# Patient Record
Sex: FEMALE | Race: BLACK OR AFRICAN AMERICAN
[De-identification: names, ages, dates, MRNs, and addresses within clinical notes are randomized per-mention and may not be internally consistent; named-entity substitution may affect disease eponyms.]

---

## 2021-05-26 ENCOUNTER — HOSPITAL ENCOUNTER (OUTPATIENT)
Dept: HOSPITAL 35 - LAB | Age: 71
End: 2021-05-26
Payer: COMMERCIAL

## 2021-05-26 DIAGNOSIS — Z01.812: Primary | ICD-10-CM

## 2021-05-26 DIAGNOSIS — Z20.822: ICD-10-CM

## 2021-05-27 ENCOUNTER — HOSPITAL ENCOUNTER (OUTPATIENT)
Dept: HOSPITAL 35 - OR | Age: 71
Discharge: HOME | End: 2021-05-27
Attending: OPHTHALMOLOGY
Payer: COMMERCIAL

## 2021-05-27 VITALS — HEIGHT: 65 IN | WEIGHT: 164 LBS | BODY MASS INDEX: 27.32 KG/M2

## 2021-05-27 VITALS — DIASTOLIC BLOOD PRESSURE: 61 MMHG | SYSTOLIC BLOOD PRESSURE: 143 MMHG

## 2021-05-27 DIAGNOSIS — Z91.041: ICD-10-CM

## 2021-05-27 DIAGNOSIS — E11.9: ICD-10-CM

## 2021-05-27 DIAGNOSIS — Z88.8: ICD-10-CM

## 2021-05-27 DIAGNOSIS — Z90.710: ICD-10-CM

## 2021-05-27 DIAGNOSIS — Z98.51: ICD-10-CM

## 2021-05-27 DIAGNOSIS — Z79.899: ICD-10-CM

## 2021-05-27 DIAGNOSIS — H04.551: ICD-10-CM

## 2021-05-27 DIAGNOSIS — Z98.890: ICD-10-CM

## 2021-05-27 DIAGNOSIS — L92.8: Primary | ICD-10-CM

## 2021-05-27 PROCEDURE — 50398: CPT

## 2021-05-27 PROCEDURE — 62900: CPT

## 2021-05-27 PROCEDURE — 56528: CPT

## 2021-05-27 PROCEDURE — 56531: CPT

## 2021-05-27 PROCEDURE — 51636: CPT

## 2021-05-27 PROCEDURE — 50386 REMOVE STENT VIA TRANSURETH: CPT

## 2021-05-27 PROCEDURE — 51777: CPT

## 2021-05-27 PROCEDURE — 62110: CPT

## 2021-05-27 PROCEDURE — 50010 RENAL EXPLORATION: CPT

## 2021-05-27 PROCEDURE — 70005: CPT

## 2021-05-27 PROCEDURE — 56527: CPT

## 2021-05-27 PROCEDURE — 50101: CPT

## 2021-06-14 NOTE — O
[facility] [place]   [address]                     OPERATIVE REPORT              
_______________________________________________________________________________
 
Name:       EDWARD FREY             Room #:                     [facility] [place] 
M.R.#:      [id number]                       Account #:      [id number]  
Admission:  05/27/21    Attend Phys:    Zacarias Rosenthal MD  
Discharge:  05/27/21    Date of Birth:  [birth year]  
                                                          Report #: [id number]
                                                                    [id number] 
_______________________________________________________________________________
THIS REPORT FOR:  
 
cc:  Ciara Perez MD,Ciara Rosenthal,Zacarias AL MD                                          ~
 
DOC #: [id number]
 
cc:     Gregg Cassidy, Ileana Rosenthal MD
DATE OF SERVICE: 05/27/2021
 
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 
obstruction.
 
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 
obstruction.
 
PROCEDURE:  Excision of tumor of right lower lid with frozen section control of 
margins, myocutaneous flap repair of defect with silicone lacrimal intubation 
and nasal surgical video endoscopy.
 
SURGEON:  Zacarias Rosenthal MD
 
ASSISTANT:  None.
 
ANESTHESIA:  General.
 
COMPLICATIONS:  None.
 
INDICATIONS:  This pleasant 71-year-old woman, has a frondular nodular mass 
destroying the medial right lower lid margin next to her inferior punctum.  The 
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  She presents today for excision
of this lesion with frozen sections along with recanalization of her lacrimal 
outflow tract with a plastic repair of the defect.  Informed consent was 
obtained to include but not limited to the potential risk for loss of vision, 
bleeding, infection, failure to improve the problem, and the potential need for 
further surgery or treatment based upon the pathologic nature of the lesion.
 
DESCRIPTION OF PROCEDURE:  The patient was taken to the operating room where 
general anesthesia was administered.  The right lower lid, the right medial 
canthal area, the right lateral wall of the nose were all generously infiltrated
with Xylocaine with epinephrine mixed with Marcaine and Wydase.  The right side 
of the nose was then packed with Afrin-soaked cottonoids.  The patient was 
subsequently prepped and draped in the usual sterile fashion.  A fine tip skin 
marking pen was utilized to outline the lesion with approximately 1 mm of 
 
 
 
[address]   [address]                     OPERATIVE REPORT              
_______________________________________________________________________________
 
Name:       EDWARD FREY [facility]             Room #:                     [facility] [place] 
M.R.#:      [id number]                       Account #:      [id number]  
Admission:  05/27/21    Attend Phys:    Zacarias Rosenthal MD  
Discharge:  05/27/21    Date of Birth:  [birth year]  
                                                          Report #: [id number]
                                                                    [id number] 
_______________________________________________________________________________
 
normal-appearing tissue around its margin.  The incisions were then made 
perpendicularly across the eyelid margin and drawn down to a point in the 
premalar space.  The specimen was then oriented on a drawing for the waiting 
pathologist.  In the base of the excision; however, there was an additional 
nodule that extended back into the orbit.  This tissue was additionally excised,
but was not a margin because it was in fat.  Hemostasis was re-achieved.  The 
pathologist snap froze the specimen and then it found to be an inflammatory mass
and not frankly neoplastic.  The additional tissue that was in the inferior 
orbit she thought was also inflammatory.  With that information at hand, 
attention was turned to reconstruction of the defect.  The superior and inferior
puncta were dilated.  Bean tubes were then passed through the superior and 
inferior puncta and down into the inferior meatus.  The cottonoids were removed 
from the nose and the nasal vault inspected.  Utilizing the video endoscope and 
the Bean hook, the Bean tubes were secured under the inferior turbinate 
atraumatically.  They were then left to angle out the nose.
 
A myocutaneous flap was then developed laterally to correct that defect.  
Hemostasis was then re-achieved.  The flap was then rotated and secured with 
multiple interrupted 5-0 Vicryl sutures deep.  5-0 Vicryl sutures were also used
to reapproximate the tarsal plate.  Care was taken to ensure that they did not 
involve the canaliculus.  The eyelid margin was then reapproximated with 
interrupted 7-0 Vicryl sutures.  The subcutaneous structures were then closed 
with 6-0 plain gut sutures.
 
The silicone tubes were then secured to themselves with 3 square throws.  They 
were then secured to the lateral wall of the nose with one 5-0 Prolene suture.  
Erythromycin ointment was generously applied over the eye and the incisions.  
The patient was subsequently transported to the recovery area, having tolerated 
the procedures well with no anesthetic or operative complications being noted.
 
MD LEXY Castillo/SUMMER
 
D: 05/27/2021 08:55 AM
T: 05/27/2021 09:11 AM
 
 
 
 
 
 
 
 
  <ELECTRONICALLY SIGNED>
   By: Zacarias Rosenthal MD          
  06/14/21     0600
D: 05/27/21 0755                           _____________________________________
T: 05/27/21 0811                           Zacarias Rosenthal MD            /nt